# Patient Record
Sex: FEMALE | Race: WHITE | NOT HISPANIC OR LATINO
[De-identification: names, ages, dates, MRNs, and addresses within clinical notes are randomized per-mention and may not be internally consistent; named-entity substitution may affect disease eponyms.]

---

## 2023-03-04 ENCOUNTER — NON-APPOINTMENT (OUTPATIENT)
Age: 78
End: 2023-03-04

## 2023-03-10 ENCOUNTER — NON-APPOINTMENT (OUTPATIENT)
Age: 78
End: 2023-03-10

## 2023-06-14 ENCOUNTER — APPOINTMENT (OUTPATIENT)
Dept: NEUROLOGY | Facility: CLINIC | Age: 78
End: 2023-06-14
Payer: MEDICARE

## 2023-06-14 ENCOUNTER — NON-APPOINTMENT (OUTPATIENT)
Age: 78
End: 2023-06-14

## 2023-06-14 VITALS
BODY MASS INDEX: 19.29 KG/M2 | DIASTOLIC BLOOD PRESSURE: 83 MMHG | WEIGHT: 120 LBS | HEART RATE: 77 BPM | HEIGHT: 66 IN | SYSTOLIC BLOOD PRESSURE: 128 MMHG

## 2023-06-14 DIAGNOSIS — Z82.49 FAMILY HISTORY OF ISCHEMIC HEART DISEASE AND OTHER DISEASES OF THE CIRCULATORY SYSTEM: ICD-10-CM

## 2023-06-14 DIAGNOSIS — Z81.2 FAMILY HISTORY OF TOBACCO ABUSE AND DEPENDENCE: ICD-10-CM

## 2023-06-14 DIAGNOSIS — E55.9 VITAMIN D DEFICIENCY, UNSPECIFIED: ICD-10-CM

## 2023-06-14 DIAGNOSIS — R41.89 OTHER SYMPTOMS AND SIGNS INVOLVING COGNITIVE FUNCTIONS AND AWARENESS: ICD-10-CM

## 2023-06-14 DIAGNOSIS — Z78.9 OTHER SPECIFIED HEALTH STATUS: ICD-10-CM

## 2023-06-14 PROBLEM — Z00.00 ENCOUNTER FOR PREVENTIVE HEALTH EXAMINATION: Status: ACTIVE | Noted: 2023-06-14

## 2023-06-14 PROCEDURE — 99204 OFFICE O/P NEW MOD 45 MIN: CPT

## 2023-06-14 NOTE — ASSESSMENT
[FreeTextEntry1] : Please get labwork - call me in one week to review results [phone]\par Please get neuropsychological testing\par Please get MRI brain\par Return to clinic in three months to see Dr. Jairon Rodriguez\par \par For brain health \par \par - healthy eating/diet for memory cognition with diet rich in fiber, fruits and vegetables and low in saturated fats, processed foods. \par - good sleep, 7-8 hours a night. No use of phone or watching television before bed. \par - aerobic exercise, at least 30 minutes, such as a brisk walk 3-4 times a week. \par - keep mind active with puzzles, reading , socializing with others. \par - abstaining from excess alcohol, drug use. \par - blood pressure and cholesterol monitoring , blood glucose monitoring to prevent microvascular disease which can lead to cognitive impairment. \par \par

## 2023-06-14 NOTE — PHYSICAL EXAM
[FreeTextEntry1] : Mental Status:  knows the month, day and the year. Able to calculate number of quarters in $1.50. Able to do serial sevens. Able to spell “world” backwards. 3/3 registration of three items, 2/3 recall at three minutes. \par CN: visual acuity, VFF, blink to confrontation \par III, IV, VI, PERRL, EOMI \par V sensation normal to light touch, pinprick\par VII normal squint vs resistance, normal smile, face symmetric \par VIII: normal hearing  \par IX, X normal gag, symmetric palate, uvula raises midline \par XI normal shrug versus resistance and lateralization of head versus resistance \par XII tongue symmetric, normal strength, no tremor or fasciculations \par Motor: full strength throughout \par Sensory: normal to LT, diminished vibratory sensation  \par Reflexes \par Brachioradialis, biceps, triceps, patella and ankles 1+ \par Plantar flexor response bilaterally \par Coordination: no dysmetria on FNF \par Gait : normal balance and gait,  able to toe and heel walk. Able to tandem. Decreased arm swing, takes small steps. Ambulates unassisted. \par \par \par

## 2023-06-14 NOTE — DISCUSSION/SUMMARY
[FreeTextEntry1] : Silvia is a 77-year-old woman with no pertinent past medical history presenting with subjective cognitive complaints, mostly with short term memory.  Has not followed up with a primary care physician in some time.

## 2023-06-14 NOTE — HISTORY OF PRESENT ILLNESS
[FreeTextEntry1] : Silvia is a 77-year-old woman with no pertinent past medical history. She is presenting with her son for evaluation of memory difficulties. She recently moved here from [place]. She will be establishing care with Julianna Neri NP soon. \par \par [facility]. Her  is in the [facility] with advanced dementia. It is difficult for her/stressful to see her  this way. She is with him . \par \par She is more forgetful. She forgets about appointments. She made a comment to her son that he must think she really has difficulties to schedule this appointment. She forgot that she was the one who requested the appointment. She sometimes forgets peoples names. She has more difficulty with short term, not long-term memory. She drives minimally here (just moved  here) - just to the grocery store. She used to work as an  and had two masters degrees. \par \par Back in [place], she was handling the bills. She was not losing items. She would not get suddenly disoriented. \par \par Medications\par None\par \par Allergies\par None\par \par Surgical History\par Left knee replacement\par \par Social History\par Recently moved here from [place]\par Drives minimally \par Worked in accounting\par Drinks alcohol a couple times a week, a glass of gin or beer. Does not drink daily \par No drug use \par Remote history of smoking, smoked for 20 years, ppd smoker - gave it up 25 years ago \par Two Masters Degrees \par \par Family History \par Mother - ( lung cancer, smoker ) \par Father  - (hypertension)\par Son - asthma \par \par

## 2023-06-26 ENCOUNTER — APPOINTMENT (OUTPATIENT)
Dept: GERIATRICS | Facility: CLINIC | Age: 78
End: 2023-06-26

## 2023-07-26 ENCOUNTER — RESULT REVIEW (OUTPATIENT)
Age: 78
End: 2023-07-26

## 2023-09-01 ENCOUNTER — APPOINTMENT (OUTPATIENT)
Dept: NEUROLOGY | Facility: CLINIC | Age: 78
End: 2023-09-01
Payer: MEDICARE

## 2023-09-01 VITALS
WEIGHT: 120 LBS | DIASTOLIC BLOOD PRESSURE: 81 MMHG | SYSTOLIC BLOOD PRESSURE: 137 MMHG | HEIGHT: 66 IN | OXYGEN SATURATION: 96 % | HEART RATE: 67 BPM | BODY MASS INDEX: 19.29 KG/M2

## 2023-09-01 DIAGNOSIS — F41.9 ANXIETY DISORDER, UNSPECIFIED: ICD-10-CM

## 2023-09-01 DIAGNOSIS — F32.A ANXIETY DISORDER, UNSPECIFIED: ICD-10-CM

## 2023-09-01 DIAGNOSIS — R41.3 OTHER AMNESIA: ICD-10-CM

## 2023-09-01 LAB
ALBUMIN SERPL ELPH-MCNC: 4.5 G/DL
ALP BLD-CCNC: 67 U/L
ALT SERPL-CCNC: 13 U/L
ANION GAP SERPL CALC-SCNC: 10 MMOL/L
AST SERPL-CCNC: 20 U/L
BILIRUB SERPL-MCNC: 0.6 MG/DL
BUN SERPL-MCNC: 19 MG/DL
CALCIUM SERPL-MCNC: 9.5 MG/DL
CHLORIDE SERPL-SCNC: 104 MMOL/L
CHOLEST SERPL-MCNC: 182 MG/DL
CO2 SERPL-SCNC: 26 MMOL/L
CREAT SERPL-MCNC: 0.78 MG/DL
EGFR: 79 ML/MIN/1.73M2
GLUCOSE SERPL-MCNC: 99 MG/DL
HDLC SERPL-MCNC: 95 MG/DL
LDLC SERPL CALC-MCNC: 76 MG/DL
NONHDLC SERPL-MCNC: 87 MG/DL
POTASSIUM SERPL-SCNC: 4.6 MMOL/L
PROT SERPL-MCNC: 6.9 G/DL
SODIUM SERPL-SCNC: 141 MMOL/L
TRIGL SERPL-MCNC: 53 MG/DL
TSH SERPL-ACNC: 1.18 UIU/ML
VIT B12 SERPL-MCNC: 517 PG/ML

## 2023-09-01 PROCEDURE — 99214 OFFICE O/P EST MOD 30 MIN: CPT

## 2023-09-01 RX ORDER — CHOLECALCIFEROL (VITAMIN D3) 25 MCG
25 MCG TABLET ORAL
Qty: 90 | Refills: 3 | Status: ACTIVE | COMMUNITY
Start: 2023-09-01 | End: 1900-01-01

## 2023-09-05 PROBLEM — R41.3 MEMORY LOSS, SHORT TERM: Status: ACTIVE | Noted: 2023-06-14

## 2023-09-05 LAB — 25(OH)D3 SERPL-MCNC: 28.2 NG/ML

## 2023-09-05 NOTE — DISCUSSION/SUMMARY
[FreeTextEntry1] : Silvia is a 77-year-old woman with no pertinent past medical history presenting with subjective cognitive complaints, mostly with short term memory. Has not followed up with a primary care physician in some time. 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

## 2023-09-05 NOTE — HISTORY OF PRESENT ILLNESS
[FreeTextEntry1] : Presenting with son. Last seen in clinic on 2023.  is in memory care. It is very stressful. He is on a different floor but she spends a lot of time with him. No falls. Poor memory to the point that she "forgets what she forgot". Trouble remembering people's names. Makes errors such as calling her  her father once. Remembers appointments. Suddenly stopped taking zoloft because she forgot to take it. Has cognitive testing scheduled soon. Needs a primary care physician.  ______________________________________ 2023  Silvia is a 77-year-old woman with no pertinent past medical history. She is presenting with her son for evaluation of memory difficulties. She recently moved here from [place]. She will be establishing care with Julianna Neri NP soon.   [facility]. Her  is in the [facility] with advanced dementia. It is difficult for her/stressful to see her  this way. She is with him .   She is more forgetful. She forgets about appointments. She made a comment to her son that he must think she really has difficulties to schedule this appointment. She forgot that she was the one who requested the appointment. She sometimes forgets peoples names. She has more difficulty with short term, not long-term memory. She drives minimally here (just moved  here) - just to the grocery store. She used to work as an  and had two masters degrees.   Back in [place], she was handling the bills. She was not losing items. She would not get suddenly disoriented.   Medications None  Allergies None  Surgical History Left knee replacement  Social History Recently moved here from [place] Drives minimally  Worked in accounting Drinks alcohol a couple times a week, a glass of gin or beer. Does not drink daily  No drug use  Remote history of smoking, smoked for 20 years, ppd smoker - gave it up 25 years ago  Two Masters Degrees   Family History  Mother - ( lung cancer, smoker )  Father  - (hypertension) Son - asthma

## 2023-09-05 NOTE — ASSESSMENT
[FreeTextEntry1] : Please start lexapro (escitalopram) 5 mg daily After one month, will increase to 10 mg daily Please stop zoloft (sertraline) Please take vitamin D 1000 units daily Please complete neuropsych testing  Primary care referral, could try Dr. Collier or Dr. Barone  [facility] in 3-4 months  For brain health   - healthy eating/diet for memory cognition with diet rich in fiber, fruits and vegetables and low in saturated fats, processed foods.  - good sleep, 7-8 hours a night. No use of phone or watching television before bed.  - aerobic exercise, at least 30 minutes, such as a brisk walk 3-4 times a week.  - keep mind active with puzzles, reading , socializing with others.  - abstaining from excess alcohol, drug use.  - blood pressure and cholesterol monitoring , blood glucose monitoring to prevent microvascular disease which can lead to cognitive impairment.

## 2023-09-05 NOTE — DATA REVIEWED
[de-identified] : 7/26/2023: MRI brain: no acute intracranial hemorrhage, acute infarction or hydrocephalus. Moderate parenchymal  volume loss. Moderate chronic microvascular changes.  [de-identified] : 6/14/2023 vitamin d 28.2, low, vitamin b12 517, creatinine 0.78, LDL 76

## 2023-09-05 NOTE — PHYSICAL EXAM
[FreeTextEntry1] : Mental Status:  knows the month, day and the year. Able to calculate number of quarters in $2.25.  Able to do serial sevens. Able to spell "world" backwards.  3/3 registration of three items, 2/3 recall at three minutes.  CN: visual acuity, VFF, blink to confrontation  III, IV, VI, PERRL, EOMI  V sensation normal to light touch, pinprick VII normal squint vs resistance, normal smile, face symmetric  VIII: normal hearing   IX, X normal gag, symmetric palate, uvula raises midline  XI normal shrug versus resistance and lateralization of head versus resistance  XII tongue symmetric, normal strength, no tremor or fasciculations  Motor: full strength throughout  Sensory: normal to LT, diminished vibratory sensation   Reflexes  Brachioradialis, biceps, triceps, patella and ankles 1+  Plantar flexor response bilaterally  Coordination: no dysmetria on FNF  Gait : normal balance and gait,  able to toe and heel walk. Able to tandem. Decreased arm swing, takes small steps. Ambulates unassisted.     Parent

## 2023-09-21 ENCOUNTER — NON-APPOINTMENT (OUTPATIENT)
Age: 78
End: 2023-09-21

## 2023-10-03 ENCOUNTER — RESULT REVIEW (OUTPATIENT)
Age: 78
End: 2023-10-03

## 2023-10-04 ENCOUNTER — APPOINTMENT (OUTPATIENT)
Dept: GERIATRICS | Facility: CLINIC | Age: 78
End: 2023-10-04
Payer: MEDICARE

## 2023-10-04 DIAGNOSIS — I49.9 CARDIAC ARRHYTHMIA, UNSPECIFIED: ICD-10-CM

## 2023-10-04 DIAGNOSIS — I49.1 ATRIAL PREMATURE DEPOLARIZATION: ICD-10-CM

## 2023-10-04 DIAGNOSIS — Z00.00 ENCOUNTER FOR GENERAL ADULT MEDICAL EXAMINATION W/OUT ABNORMAL FINDINGS: ICD-10-CM

## 2023-10-04 DIAGNOSIS — Z11.59 ENCOUNTER FOR SCREENING FOR OTHER VIRAL DISEASES: ICD-10-CM

## 2023-10-04 DIAGNOSIS — Z23 ENCOUNTER FOR IMMUNIZATION: ICD-10-CM

## 2023-10-04 DIAGNOSIS — Z71.89 OTHER SPECIFIED COUNSELING: ICD-10-CM

## 2023-10-04 DIAGNOSIS — Z13.220 ENCOUNTER FOR SCREENING FOR LIPOID DISORDERS: ICD-10-CM

## 2023-10-04 PROCEDURE — 90662 IIV NO PRSV INCREASED AG IM: CPT

## 2023-10-04 PROCEDURE — G0008: CPT

## 2023-10-04 PROCEDURE — 99497 ADVNCD CARE PLAN 30 MIN: CPT

## 2023-10-04 PROCEDURE — G0444 DEPRESSION SCREEN ANNUAL: CPT | Mod: 59

## 2023-10-04 PROCEDURE — 93000 ELECTROCARDIOGRAM COMPLETE: CPT | Mod: 59

## 2023-10-04 PROCEDURE — 99205 OFFICE O/P NEW HI 60 MIN: CPT | Mod: 25

## 2023-10-04 RX ORDER — INFLUENZA A VIRUS A/VICTORIA/4897/2022 IVR-238 (H1N1) ANTIGEN (FORMALDEHYDE INACTIVATED), INFLUENZA A VIRUS A/DARWIN/9/2021 SAN-010 (H3N2) ANTIGEN (FORMALDEHYDE INACTIVATED), INFLUENZA B VIRUS B/PHUKET/3073/2013 ANTIGEN (FORMALDEHYDE INACTIVATED), AND INFLUENZA B VIRUS B/MICHIGAN/01/2021 ANTIGEN (FORMALDEHYDE INACTIVATED) 60; 60; 60; 60 UG/.7ML; UG/.7ML; UG/.7ML; UG/.7ML
0.7 INJECTION, SUSPENSION INTRAMUSCULAR
Qty: 1 | Refills: 0 | Status: ACTIVE | COMMUNITY
Start: 2023-10-04 | End: 1900-01-01

## 2023-10-05 LAB
BASOPHILS # BLD AUTO: 0.07 K/UL
BASOPHILS NFR BLD AUTO: 0.8 %
CHOLEST SERPL-MCNC: 196 MG/DL
EOSINOPHIL # BLD AUTO: 0.17 K/UL
EOSINOPHIL NFR BLD AUTO: 2.1 %
HBV SURFACE AG SER QL: NONREACTIVE
HCT VFR BLD CALC: 45.9 %
HCV AB SER QL: NONREACTIVE
HCV S/CO RATIO: 0.06 S/CO
HDLC SERPL-MCNC: 108 MG/DL
HGB BLD-MCNC: 14.5 G/DL
IMM GRANULOCYTES NFR BLD AUTO: 0.4 %
LDLC SERPL CALC-MCNC: 74 MG/DL
LYMPHOCYTES # BLD AUTO: 1.5 K/UL
LYMPHOCYTES NFR BLD AUTO: 18.2 %
MAN DIFF?: NORMAL
MCHC RBC-ENTMCNC: 31.6 GM/DL
MCHC RBC-ENTMCNC: 31.6 PG
MCV RBC AUTO: 100 FL
MONOCYTES # BLD AUTO: 0.73 K/UL
MONOCYTES NFR BLD AUTO: 8.8 %
NEUTROPHILS # BLD AUTO: 5.75 K/UL
NEUTROPHILS NFR BLD AUTO: 69.7 %
NONHDLC SERPL-MCNC: 88 MG/DL
PLATELET # BLD AUTO: 273 K/UL
RBC # BLD: 4.59 M/UL
RBC # FLD: 15.3 %
TRIGL SERPL-MCNC: 78 MG/DL
WBC # FLD AUTO: 8.25 K/UL

## 2023-10-12 ENCOUNTER — NON-APPOINTMENT (OUTPATIENT)
Age: 78
End: 2023-10-12

## 2023-10-16 ENCOUNTER — APPOINTMENT (OUTPATIENT)
Dept: GERIATRICS | Facility: CLINIC | Age: 78
End: 2023-10-16

## 2023-12-12 RX ORDER — ESCITALOPRAM OXALATE 10 MG/1
10 TABLET ORAL DAILY
Qty: 90 | Refills: 0 | Status: ACTIVE | COMMUNITY
Start: 2023-09-01 | End: 1900-01-01

## 2023-12-27 ENCOUNTER — TRANSCRIPTION ENCOUNTER (OUTPATIENT)
Age: 78
End: 2023-12-27

## 2024-01-02 ENCOUNTER — APPOINTMENT (OUTPATIENT)
Dept: NEUROLOGY | Facility: CLINIC | Age: 79
End: 2024-01-02

## 2024-01-09 ENCOUNTER — RESULT REVIEW (OUTPATIENT)
Age: 79
End: 2024-01-09

## 2024-01-09 ENCOUNTER — APPOINTMENT (OUTPATIENT)
Dept: NEUROSURGERY | Facility: CLINIC | Age: 79
End: 2024-01-09
Payer: MEDICARE

## 2024-01-09 VITALS
DIASTOLIC BLOOD PRESSURE: 83 MMHG | WEIGHT: 122 LBS | HEART RATE: 87 BPM | HEIGHT: 66 IN | SYSTOLIC BLOOD PRESSURE: 138 MMHG | BODY MASS INDEX: 19.61 KG/M2 | OXYGEN SATURATION: 97 %

## 2024-01-09 PROCEDURE — 99024 POSTOP FOLLOW-UP VISIT: CPT

## 2024-01-17 ENCOUNTER — APPOINTMENT (OUTPATIENT)
Dept: NEUROSURGERY | Facility: HOSPITAL | Age: 79
End: 2024-01-17

## 2024-01-25 ENCOUNTER — TRANSCRIPTION ENCOUNTER (OUTPATIENT)
Age: 79
End: 2024-01-25

## 2024-02-05 ENCOUNTER — APPOINTMENT (OUTPATIENT)
Dept: NEUROSURGERY | Facility: CLINIC | Age: 79
End: 2024-02-05
Payer: MEDICARE

## 2024-02-05 PROCEDURE — 99212 OFFICE O/P EST SF 10 MIN: CPT | Mod: 24

## 2024-05-04 NOTE — PHYSICAL EXAM
[General Appearance - Alert] : alert [Longitudinal] : longitudinal [Clean] : clean [Dry] : dry [Sutures Intact] : closed with intact sutures [Comprehension] : comprehension intact [Sensation Tactile Decrease] : light touch was intact [Well-Healed] : well-healed [FreeTextEntry1] : Left scalp [Fluency] : fluency not intact [FreeTextEntry5] : eyes closed [FreeTextEntry6] : Right sided spastic hemiparesis

## 2024-05-04 NOTE — ASSESSMENT
[FreeTextEntry1] : Ms. Mcconnell has been doing well since her left sided craniectomy for evacuation of acute SDH on 12/12/23.   She has been participating with her therapy regimen at [facility], however she has recently been more confused and lethargic.  Interval follow up CT Head showed increased shift due to sunken flap.  She underwent LEFT sided cranioplasty >5cm on 1/17/24.   Her sutures will be removed by Rehab staff. .  She should return in 3 months for a progress check.   The patient and her family understand the plan of care and are in agreement. All questions answered to patient satisfaction.

## 2024-05-04 NOTE — HISTORY OF PRESENT ILLNESS
[FreeTextEntry1] : The patient has given verbal consent for this telehealth visit using two-way audio and video technology.  The patient is currently located at [facility], and I am located at my office at [facility].   Ms. Hinton returns to the office today in postoperative follow up of Left sided cranioplasty performed on 1/17/23 for sunken flap syndrome, with worsening gait, right sided spastic hemiparesis and decreased mentation and speech output.  She was hospitalized at [facility] from 1/10/24-1/25/24 and she was found to have a UTI preoperatively and was treated with a course of antibiotics and she was seen by Neurology and EEG revealed rare left central spikes suggestive of epileptic focus.  She was maintained on Depakote. She was discharged to [facility] on 1/25/24.  Since her discharge she has been neurologically improving. Her speech is more fluent and her right sided spastic hemiparesis has improved.  Her sutures were removed on 1/31 without difficulty.   1/9/24: JESSICA HINTON is a 78 year female with a PMH of depression who presents to the office today for neurosurgical follow up of recent hospitalization at [facility] from 12/12/23 to 12/27/23 due to acute large left subdural hematoma and traumatic SAH bilaterally sustained from mechanical fall after being pulled down by her dog while out for a walk. She underwent an emergency left sided decompressive craniectomy for subdural evacuation on 12/12/23.  She additionally was found to have a nondisplaced right greater wing of sphenoid fracture extending to anterior temporal squamosal bone fracture. She underwent serial CT scans during hospitalization which showed good evacuation of her SDH and ultimately was discharged to [facility] on 12/27/23.  The patient was treated with Keppra, later switched to Depakote for 7 days for seizure prophylaxis, which is now complete. EEG was negative during admission. Her staples were removed on 12/26. Patient was seen by orthopedic surgery for reported distal 1/3 clavicle fx on right side and superior pubic ramus fx on right side. She had periods of lethargy and agitation and persistent expressive aphasia during her hospitalization.   She was not taking any AC or antiplatelet agents prior to the fall. She was fitted for helmet prior to discharge.   The patient has been experiencing periods of altered mental status while at rehab.  Her craniectomy site is sunken.  She has not had any fevers.  She underwent a followup CT of the head today, which was independently reviewed by me and which shows increased shift and sunken flap Meds: depakote, colace, mirtazapine, pantoprazole, senna Allergies: NKDA Soc Hx: currently at [facility]

## 2024-05-04 NOTE — END OF VISIT
[Time Spent: ___ minutes] : I have spent [unfilled] minutes of time on the encounter. [>50% of the face to face encounter time was spent on counseling and/or coordination of care for ___] : Greater than 50% of the face to face encounter time was spent on counseling and/or coordination of care for [unfilled] [FreeTextEntry3] : I have seen the patient and reviewed the case together with PA and I agree with the final recommendations and plan of care.  Juan Farias MD Neurosurgery

## 2024-05-08 ENCOUNTER — APPOINTMENT (OUTPATIENT)
Dept: NEUROSURGERY | Facility: CLINIC | Age: 79
End: 2024-05-08
Payer: MEDICARE

## 2024-05-08 DIAGNOSIS — S06.5XAA TRAUMATIC SUBDURAL HEMORRHAGE WITH LOSS OF CONSCIOUSNESS STATUS UNKNOWN, INITIAL ENCOUNTER: ICD-10-CM

## 2024-05-08 PROCEDURE — 99212 OFFICE O/P EST SF 10 MIN: CPT

## 2024-05-08 NOTE — END OF VISIT
[FreeTextEntry3] : I have seen the patient and reviewed the case together with PA and I agree with the final recommendations and plan of care.  Juan Farias MD Neurosurgery  [Time Spent: ___ minutes] : I have spent [unfilled] minutes of time on the encounter.

## 2024-05-08 NOTE — PHYSICAL EXAM
[General Appearance - Alert] : alert [Longitudinal] : longitudinal [Clean] : clean [Dry] : dry [Well-Healed] : well-healed [Comprehension] : comprehension intact [FreeTextEntry1] : Left scalp [Fluency] : fluency not intact [FreeTextEntry5] : eyes closed, says occasional word, persistent expressive aphasia [FreeTextEntry6] : Right sided spastic hemiparesis

## 2024-05-08 NOTE — ASSESSMENT
[FreeTextEntry1] : Ms. Mcconnell has been doing well since her left sided craniectomy for evacuation of acute SDH on 12/12/23.   She has been participating with her therapy regimen at [facility], however she has recently been more confused and lethargic.  Interval follow up CT Head showed increased shift due to sunken flap.  She underwent LEFT sided cranioplasty >5cm on 1/17/24.   She should continue rehabilitation.  She should see Dr. Rodriguez for follow up for EEG monitoring and possible AED wean.  She should follow up with us prn at this point.   The patient and her family understand the plan of care and are in agreement. All questions answered to patient satisfaction.

## 2024-05-08 NOTE — HISTORY OF PRESENT ILLNESS
[FreeTextEntry1] : The patient has given verbal consent for this telehealth visit using two-way audio and video technology.  The patient is currently located at home [address] [place] [address] [facility] [place] [address], and I am located at my office at [facility].  Ms. Hinton returns via telehealth today in postoperative follow up of Left sided cranioplasty performed on 1/17/23 for sunken flap syndrome, with worsening gait, right sided spastic hemiparesis and decreased mentation and speech output. She continues to have waxing and waning neurological picture, frequently somnolent.  Lives in Memory care unit at nursing facility at the [facility].  No fever, chills, wound well healed.    2/5/24: Ms. Hinton returns to the office today in postoperative follow up of Left sided cranioplasty performed on 1/17/23 for sunken flap syndrome, with worsening gait, right sided spastic hemiparesis and decreased mentation and speech output.  She was hospitalized at [facility] from 1/10/24-1/25/24 and she was found to have a UTI preoperatively and was treated with a course of antibiotics and she was seen by Neurology and EEG revealed rare left central spikes suggestive of epileptic focus.  She was maintained on Depakote. She was discharged to [facility] on 1/25/24.  Since her discharge she has been neurologically improving. Her speech is more fluent and her right sided spastic hemiparesis has improved.  Her sutures were removed on 1/31 without difficulty.   1/9/24: JESSICA HINTON is a 78 year female with a PMH of depression who presents to the office today for neurosurgical follow up of recent hospitalization at [facility] from 12/12/23 to 12/27/23 due to acute large left subdural hematoma and traumatic SAH bilaterally sustained from mechanical fall after being pulled down by her dog while out for a walk. She underwent an emergency left sided decompressive craniectomy for subdural evacuation on 12/12/23.  She additionally was found to have a nondisplaced right greater wing of sphenoid fracture extending to anterior temporal squamosal bone fracture. She underwent serial CT scans during hospitalization which showed good evacuation of her SDH and ultimately was discharged to [facility] on 12/27/23.  The patient was treated with Keppra, later switched to Depakote for 7 days for seizure prophylaxis, which is now complete. EEG was negative during admission. Her staples were removed on 12/26. Patient was seen by orthopedic surgery for reported distal 1/3 clavicle fx on right side and superior pubic ramus fx on right side. She had periods of lethargy and agitation and persistent expressive aphasia during her hospitalization.   She was not taking any AC or antiplatelet agents prior to the fall. She was fitted for helmet prior to discharge.   The patient has been experiencing periods of altered mental status while at rehab.  Her craniectomy site is sunken.  She has not had any fevers.  She underwent a followup CT of the head today, which was independently reviewed by me and which shows increased shift and sunken flap Meds: depakote, colace, mirtazapine, pantoprazole, senna Allergies: NKDA Soc Hx: currently at [facility]

## 2024-06-03 ENCOUNTER — APPOINTMENT (OUTPATIENT)
Dept: NEUROLOGY | Facility: CLINIC | Age: 79
End: 2024-06-03